# Patient Record
Sex: FEMALE | Race: WHITE | NOT HISPANIC OR LATINO | URBAN - METROPOLITAN AREA
[De-identification: names, ages, dates, MRNs, and addresses within clinical notes are randomized per-mention and may not be internally consistent; named-entity substitution may affect disease eponyms.]

---

## 2023-02-25 LAB
CHLAMYDIA TRACH., AMPLIFIED: NEGATIVE
N. GONORRHEA, AMPLIFIED: NEGATIVE
TRICHOMONAS VAGINALIS: NEGATIVE

## 2023-02-26 LAB — URINE CULTURE: NORMAL

## 2023-05-03 LAB
ALANINE AMINOTRANSFERASE (SGPT) (U/L) IN SER/PLAS: 12 U/L (ref 7–45)
ALBUMIN (G/DL) IN SER/PLAS: 4.4 G/DL (ref 3.4–5)
ALKALINE PHOSPHATASE (U/L) IN SER/PLAS: 49 U/L (ref 33–110)
ANION GAP IN SER/PLAS: 13 MMOL/L (ref 10–20)
ASPARTATE AMINOTRANSFERASE (SGOT) (U/L) IN SER/PLAS: 15 U/L (ref 9–39)
BASOPHILS (10*3/UL) IN BLOOD BY AUTOMATED COUNT: 0.05 X10E9/L (ref 0–0.1)
BASOPHILS/100 LEUKOCYTES IN BLOOD BY AUTOMATED COUNT: 0.5 % (ref 0–2)
BILIRUBIN TOTAL (MG/DL) IN SER/PLAS: 0.4 MG/DL (ref 0–1.2)
CALCIUM (MG/DL) IN SER/PLAS: 9.3 MG/DL (ref 8.6–10.3)
CARBON DIOXIDE, TOTAL (MMOL/L) IN SER/PLAS: 24 MMOL/L (ref 21–32)
CHLORIDE (MMOL/L) IN SER/PLAS: 105 MMOL/L (ref 98–107)
COBALAMIN (VITAMIN B12) (PG/ML) IN SER/PLAS: 213 PG/ML (ref 211–911)
CREATININE (MG/DL) IN SER/PLAS: 0.68 MG/DL (ref 0.5–1.05)
EOSINOPHILS (10*3/UL) IN BLOOD BY AUTOMATED COUNT: 0.18 X10E9/L (ref 0–0.7)
EOSINOPHILS/100 LEUKOCYTES IN BLOOD BY AUTOMATED COUNT: 1.9 % (ref 0–6)
ERYTHROCYTE DISTRIBUTION WIDTH (RATIO) BY AUTOMATED COUNT: 12.4 % (ref 11.5–14.5)
ERYTHROCYTE MEAN CORPUSCULAR HEMOGLOBIN CONCENTRATION (G/DL) BY AUTOMATED: 32.9 G/DL (ref 32–36)
ERYTHROCYTE MEAN CORPUSCULAR VOLUME (FL) BY AUTOMATED COUNT: 94 FL (ref 80–100)
ERYTHROCYTES (10*6/UL) IN BLOOD BY AUTOMATED COUNT: 4.4 X10E12/L (ref 4–5.2)
FERRITIN (UG/LL) IN SER/PLAS: 37 UG/L (ref 8–150)
GFR FEMALE: >90 ML/MIN/1.73M2
GLUCOSE (MG/DL) IN SER/PLAS: 78 MG/DL (ref 74–99)
HEMATOCRIT (%) IN BLOOD BY AUTOMATED COUNT: 41.3 % (ref 36–46)
HEMOGLOBIN (G/DL) IN BLOOD: 13.6 G/DL (ref 12–16)
IGA (MG/DL) IN SER/PLAS: 132 MG/DL (ref 70–400)
IMMATURE GRANULOCYTES/100 LEUKOCYTES IN BLOOD BY AUTOMATED COUNT: 0.3 % (ref 0–0.9)
IRON (UG/DL) IN SER/PLAS: 101 UG/DL (ref 35–150)
IRON BINDING CAPACITY (UG/DL) IN SER/PLAS: 509 UG/DL (ref 240–445)
IRON SATURATION (%) IN SER/PLAS: 20 % (ref 25–45)
LEUKOCYTES (10*3/UL) IN BLOOD BY AUTOMATED COUNT: 9.4 X10E9/L (ref 4.4–11.3)
LYMPHOCYTES (10*3/UL) IN BLOOD BY AUTOMATED COUNT: 2.23 X10E9/L (ref 1.2–4.8)
LYMPHOCYTES/100 LEUKOCYTES IN BLOOD BY AUTOMATED COUNT: 23.8 % (ref 13–44)
MONOCYTES (10*3/UL) IN BLOOD BY AUTOMATED COUNT: 0.63 X10E9/L (ref 0.1–1)
MONOCYTES/100 LEUKOCYTES IN BLOOD BY AUTOMATED COUNT: 6.7 % (ref 2–10)
NEUTROPHILS (10*3/UL) IN BLOOD BY AUTOMATED COUNT: 6.25 X10E9/L (ref 1.2–7.7)
NEUTROPHILS/100 LEUKOCYTES IN BLOOD BY AUTOMATED COUNT: 66.8 % (ref 40–80)
PLATELETS (10*3/UL) IN BLOOD AUTOMATED COUNT: 392 X10E9/L (ref 150–450)
POTASSIUM (MMOL/L) IN SER/PLAS: 4.1 MMOL/L (ref 3.5–5.3)
PROTEIN TOTAL: 7.5 G/DL (ref 6.4–8.2)
SODIUM (MMOL/L) IN SER/PLAS: 138 MMOL/L (ref 136–145)
THYROTROPIN (MIU/L) IN SER/PLAS BY DETECTION LIMIT <= 0.05 MIU/L: 0.77 MIU/L (ref 0.44–3.98)
THYROXINE (T4) FREE (NG/DL) IN SER/PLAS: 0.99 NG/DL (ref 0.61–1.12)
TISSUE TRANSGLUTAMINASE IGG: <1 U/ML (ref 0–14)
UREA NITROGEN (MG/DL) IN SER/PLAS: 6 MG/DL (ref 6–23)

## 2023-05-06 LAB — VITAMIN D 1,25-DIHYDROXY: 75.4 PG/ML (ref 19.9–79.3)

## 2023-05-08 ENCOUNTER — APPOINTMENT (OUTPATIENT)
Dept: PRIMARY CARE | Facility: CLINIC | Age: 23
End: 2023-05-08
Payer: OTHER GOVERNMENT

## 2023-05-18 ENCOUNTER — APPOINTMENT (OUTPATIENT)
Dept: PRIMARY CARE | Facility: CLINIC | Age: 23
End: 2023-05-18
Payer: OTHER GOVERNMENT

## 2023-07-12 ENCOUNTER — APPOINTMENT (OUTPATIENT)
Dept: PRIMARY CARE | Facility: CLINIC | Age: 23
End: 2023-07-12
Payer: OTHER GOVERNMENT

## 2023-10-03 ENCOUNTER — OFFICE VISIT (OUTPATIENT)
Dept: URGENT CARE | Facility: CLINIC | Age: 23
End: 2023-10-03
Payer: OTHER GOVERNMENT

## 2023-10-03 VITALS
OXYGEN SATURATION: 98 % | BODY MASS INDEX: 22.66 KG/M2 | RESPIRATION RATE: 14 BRPM | DIASTOLIC BLOOD PRESSURE: 87 MMHG | WEIGHT: 120 LBS | TEMPERATURE: 97.7 F | HEART RATE: 106 BPM | SYSTOLIC BLOOD PRESSURE: 125 MMHG | HEIGHT: 61 IN

## 2023-10-03 DIAGNOSIS — R39.15 URINARY URGENCY: ICD-10-CM

## 2023-10-03 DIAGNOSIS — N39.0 ACUTE UTI: Primary | ICD-10-CM

## 2023-10-03 LAB
POC APPEARANCE, URINE: ABNORMAL
POC BILIRUBIN, URINE: NEGATIVE
POC BLOOD, URINE: ABNORMAL
POC COLOR, URINE: YELLOW
POC GLUCOSE, URINE: NEGATIVE MG/DL
POC KETONES, URINE: NEGATIVE MG/DL
POC LEUKOCYTES, URINE: ABNORMAL
POC NITRITE,URINE: POSITIVE
POC PH, URINE: 6 PH
POC PROTEIN, URINE: ABNORMAL MG/DL
POC SPECIFIC GRAVITY, URINE: 1.02
POC UROBILINOGEN, URINE: 1 EU/DL
PREGNANCY TEST URINE, POC: NEGATIVE

## 2023-10-03 PROCEDURE — 81002 URINALYSIS NONAUTO W/O SCOPE: CPT | Performed by: PHYSICIAN ASSISTANT

## 2023-10-03 PROCEDURE — 99213 OFFICE O/P EST LOW 20 MIN: CPT | Performed by: PHYSICIAN ASSISTANT

## 2023-10-03 PROCEDURE — 81025 URINE PREGNANCY TEST: CPT | Performed by: PHYSICIAN ASSISTANT

## 2023-10-03 RX ORDER — CEPHALEXIN 500 MG/1
500 CAPSULE ORAL 4 TIMES DAILY
Qty: 28 CAPSULE | Refills: 0 | Status: SHIPPED | OUTPATIENT
Start: 2023-10-03 | End: 2023-10-10

## 2023-10-03 RX ORDER — HYOSCYAMINE SULFATE 0.12 MG/1
1 TABLET SUBLINGUAL 3 TIMES DAILY PRN
COMMUNITY
Start: 2023-05-03

## 2023-10-03 RX ORDER — NORETHINDRONE ACETATE AND ETHINYL ESTRADIOL, AND FERROUS FUMARATE 1.5-30(21)
1 KIT ORAL DAILY
COMMUNITY

## 2023-10-03 RX ORDER — NAPROXEN 500 MG/1
1 TABLET ORAL
COMMUNITY
Start: 2020-07-06

## 2023-10-03 RX ORDER — ONDANSETRON 4 MG/1
4 TABLET, ORALLY DISINTEGRATING ORAL EVERY 8 HOURS PRN
COMMUNITY
Start: 2023-08-28

## 2023-10-03 RX ORDER — PANTOPRAZOLE SODIUM 40 MG/1
40 TABLET, DELAYED RELEASE ORAL DAILY
COMMUNITY
Start: 2023-01-18

## 2023-10-03 RX ORDER — PROMETHAZINE HYDROCHLORIDE 12.5 MG/1
1 TABLET ORAL EVERY 4 HOURS PRN
COMMUNITY
Start: 2023-05-03

## 2023-10-03 RX ORDER — SERTRALINE HYDROCHLORIDE 50 MG/1
50 TABLET, FILM COATED ORAL DAILY
COMMUNITY

## 2023-10-03 ASSESSMENT — ENCOUNTER SYMPTOMS: DYSURIA: 1

## 2023-10-03 ASSESSMENT — PAIN SCALES - GENERAL: PAINLEVEL: 6

## 2023-10-03 NOTE — PROGRESS NOTES
Subjective   Patient ID: Paz Stevens is a 22 y.o. female.    Dragon inop.  Patient complains of dysuria and urine cloudiness for one day.          The following portions of the chart were reviewed this encounter and updated as appropriate:         Review of Systems   Genitourinary:  Positive for dysuria and urgency.   All other systems reviewed and are negative.    Objective   Physical Exam  Constitutional:       Appearance: Normal appearance. She is normal weight.   HENT:      Head: Normocephalic.      Mouth/Throat:      Mouth: Mucous membranes are moist.      Pharynx: Oropharynx is clear.   Eyes:      Extraocular Movements: Extraocular movements intact.      Conjunctiva/sclera: Conjunctivae normal.      Pupils: Pupils are equal, round, and reactive to light.   Pulmonary:      Effort: Pulmonary effort is normal.      Breath sounds: Normal breath sounds.   Abdominal:      General: Abdomen is flat.      Palpations: Abdomen is soft.   Skin:     General: Skin is warm and dry.   Neurological:      General: No focal deficit present.      Mental Status: She is alert and oriented to person, place, and time.   Psychiatric:         Mood and Affect: Mood normal.         Behavior: Behavior normal.         Thought Content: Thought content normal.         Judgment: Judgment normal.       Procedures    Assessment/Plan     Jay Jay hernándezp.  Urine culture ordered.  Prescribed Keflex 500 mg.    CLINICAL IMPRESSION:  Acute UTI

## 2023-10-06 LAB — BACTERIA UR CULT: ABNORMAL

## 2024-07-02 ENCOUNTER — APPOINTMENT (OUTPATIENT)
Dept: OBSTETRICS AND GYNECOLOGY | Facility: CLINIC | Age: 24
End: 2024-07-02
Payer: COMMERCIAL

## 2024-07-19 ENCOUNTER — APPOINTMENT (OUTPATIENT)
Dept: PRIMARY CARE | Facility: CLINIC | Age: 24
End: 2024-07-19
Payer: COMMERCIAL

## 2024-07-29 PROBLEM — N30.00 ACUTE CYSTITIS WITHOUT HEMATURIA: Status: ACTIVE | Noted: 2024-07-29

## 2024-07-29 PROBLEM — E53.8 VITAMIN B12 DEFICIENCY: Status: ACTIVE | Noted: 2024-07-29

## 2024-07-29 PROBLEM — E61.1 IRON DEFICIENCY: Status: ACTIVE | Noted: 2024-07-29

## 2024-07-29 PROBLEM — N39.0 ACUTE UTI: Status: ACTIVE | Noted: 2024-07-29

## 2024-07-29 PROBLEM — R11.0 NAUSEA IN ADULT: Status: ACTIVE | Noted: 2024-07-29

## 2024-07-29 PROBLEM — J32.9 SINUSITIS: Status: ACTIVE | Noted: 2024-07-29

## 2024-07-29 PROBLEM — N92.6 MENSTRUAL PERIOD LATE: Status: ACTIVE | Noted: 2024-07-29

## 2024-07-29 PROBLEM — N94.6 DYSMENORRHEA: Status: ACTIVE | Noted: 2024-07-29

## 2024-07-29 PROBLEM — F90.0 ADHD, PREDOMINANTLY INATTENTIVE TYPE: Status: ACTIVE | Noted: 2024-07-29

## 2024-07-29 PROBLEM — R53.83 FATIGUE: Status: ACTIVE | Noted: 2024-07-29

## 2024-07-29 PROBLEM — F41.9 ANXIETY: Status: ACTIVE | Noted: 2024-07-29

## 2024-07-29 PROBLEM — E55.9 VITAMIN D DEFICIENCY: Status: ACTIVE | Noted: 2024-07-29

## 2024-07-29 PROBLEM — K21.9 GERD (GASTROESOPHAGEAL REFLUX DISEASE): Status: ACTIVE | Noted: 2024-07-29

## 2024-07-29 PROBLEM — R10.13 EPIGASTRIC PAIN: Status: ACTIVE | Noted: 2024-07-29

## 2024-07-29 PROBLEM — J02.9 SORE THROAT: Status: ACTIVE | Noted: 2024-07-29

## 2024-07-29 PROBLEM — N76.0 BACTERIAL VAGINITIS: Status: ACTIVE | Noted: 2024-07-29

## 2024-07-29 PROBLEM — N91.2 AMENORRHEA: Status: ACTIVE | Noted: 2024-07-29

## 2024-07-29 PROBLEM — B96.89 BACTERIAL VAGINITIS: Status: ACTIVE | Noted: 2024-07-29

## 2024-07-29 PROBLEM — R35.0 INCREASED FREQUENCY OF URINATION: Status: ACTIVE | Noted: 2024-07-29

## 2024-07-29 PROBLEM — R39.9 UTI SYMPTOMS: Status: ACTIVE | Noted: 2024-07-29

## 2024-07-29 RX ORDER — DROSPIRENONE AND ETHINYL ESTRADIOL 0.03MG-3MG
KIT ORAL
COMMUNITY
End: 2024-07-30 | Stop reason: WASHOUT

## 2024-07-30 ENCOUNTER — APPOINTMENT (OUTPATIENT)
Dept: PRIMARY CARE | Facility: CLINIC | Age: 24
End: 2024-07-30
Payer: COMMERCIAL

## 2024-07-30 VITALS
BODY MASS INDEX: 24.55 KG/M2 | HEIGHT: 61 IN | SYSTOLIC BLOOD PRESSURE: 104 MMHG | DIASTOLIC BLOOD PRESSURE: 70 MMHG | WEIGHT: 130 LBS

## 2024-07-30 DIAGNOSIS — K21.9 GASTROESOPHAGEAL REFLUX DISEASE WITHOUT ESOPHAGITIS: ICD-10-CM

## 2024-07-30 DIAGNOSIS — F41.9 ANXIETY: ICD-10-CM

## 2024-07-30 DIAGNOSIS — Z00.00 HEALTH CARE MAINTENANCE: ICD-10-CM

## 2024-07-30 DIAGNOSIS — Z11.3 SCREEN FOR STD (SEXUALLY TRANSMITTED DISEASE): ICD-10-CM

## 2024-07-30 DIAGNOSIS — E55.9 VITAMIN D DEFICIENCY: Primary | ICD-10-CM

## 2024-07-30 PROCEDURE — 1036F TOBACCO NON-USER: CPT | Performed by: STUDENT IN AN ORGANIZED HEALTH CARE EDUCATION/TRAINING PROGRAM

## 2024-07-30 PROCEDURE — 99204 OFFICE O/P NEW MOD 45 MIN: CPT | Performed by: STUDENT IN AN ORGANIZED HEALTH CARE EDUCATION/TRAINING PROGRAM

## 2024-07-30 PROCEDURE — 3008F BODY MASS INDEX DOCD: CPT | Performed by: STUDENT IN AN ORGANIZED HEALTH CARE EDUCATION/TRAINING PROGRAM

## 2024-07-30 RX ORDER — PAROXETINE 10 MG/1
10 TABLET, FILM COATED ORAL EVERY MORNING
Qty: 30 TABLET | Refills: 1 | Status: SHIPPED | OUTPATIENT
Start: 2024-07-30 | End: 2024-09-28

## 2024-07-30 RX ORDER — HYDROXYZINE HYDROCHLORIDE 25 MG/1
25 TABLET, FILM COATED ORAL 2 TIMES DAILY PRN
Qty: 60 TABLET | Refills: 0 | Status: SHIPPED | OUTPATIENT
Start: 2024-07-30 | End: 2024-08-29

## 2024-07-30 NOTE — PROGRESS NOTES
"Subjective   Patient ID: Paz Stevens is a 23 y.o. female who presents for [facility] Care (anxiety).  Paz Stevens is a 24 yo woman with past medical history depression/anxiety, GERD who is presenting to established and for antidepressant medication recommendations. Patient mentions that she has been taking Sertraline for depression and anxiety, but recently stopped taking it in April 2024. She mentions that she felt like it wasn't working and was having side-effects - nausea, \"brain zaps\", insomnia. She had recently increased dose to 50mg from 25mg in December 2023. She also endorses that she has been having panic attacks that started this week. She endorses 1-2x a day for this week with 3x on Friday. During these attacks, she endorses dyspnea, crying, chest pain, and shaking. Patient mentions that she only takes her birth control and the sertraline and PRN zolfran and phenergan.     PMH: As stated above  PSH: No surgeries  Social: Lives with father, step-mom, and step-brother. 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 0-1 drinks/week, no tobacco use, no drugs.  Family: No family of cancer or heart disease.  Medications/Allergies: Anaphylaxis with macrobid           Review of Systems  ROS: 12 systems reviewed and negative except otherwise noted in HPI above.    Objective   Physical Exam  .GEN: conversant, NAD  HEENT: PERRL, EOMI, MMM OP clear, TMs clear bilaterally  NECK: supple, no cervical LAD, no carotid bruits  CV: S1, S2, regular, no murmur  PULM: CTAB  ABD: soft, NT, ND, BS+  EXT: no LE edema  NEURO: no gross focal deficits  PSYCH: appropriate affect       Assessment/Plan   Problem List Items Addressed This Visit             ICD-10-CM    Anxiety F41.9    Relevant Medications    hydrOXYzine HCL (Atarax) 25 mg tablet    PARoxetine (Paxil) 10 mg tablet    Other Relevant Orders    Referral to Psychology    Referral to [facility]    GERD (gastroesophageal " reflux disease) K21.9    Relevant Orders    US right upper quadrant    Vitamin D deficiency - Primary E55.9    Relevant Orders    Vitamin D 25-Hydroxy,Total (for eval of Vitamin D levels)     Other Visit Diagnoses         Codes    Screen for STD (sexually transmitted disease)     Z11.3    Relevant Orders    C. Trachomatis / N. Gonorrhoeae, Amplified Detection    HIV 1/2 Antigen/Antibody Screen with Reflex to Confirmation    Syphilis Screen with Reflex    Trichomonas vaginalis, Nucleic Acid Detection    Health care maintenance     Z00.00    Relevant Orders    CBC    Comprehensive Metabolic Panel    Lipid panel    TSH with reflex to Free T4 if abnormal          #Anxiety/Depression  #Panic Disorder  - Will switch to Paroxetine from Sertraline and add PRN atarax for panic episodes  - Will give referral to Psychologist for possible CBT    #Nausea  #Epigastric Pain  Patient has had endoscopy with no significant pathology  - Will check RUQ U/S    #Health Maintenance  - Patient follows with OBGYN  - Will get labs: CBC, CMP, Lipid Panel, Vitamin D  - Will check STI panel    Return to care in 6 to 12-month     Yonatan Romero MD 07/30/24 10:26 AM     Patient seen and examined for care, continues issues with anxiety, recommend alternative SSRI discussed side effect profile psych and [facility] to referral.  Routine lab work, discussed care plan, she is agreeable, she will call with worsening symptoms or concerns.  Return to care in 6 to 12 months or earlier if symptoms not improved

## 2024-08-06 ENCOUNTER — OFFICE VISIT (OUTPATIENT)
Dept: BEHAVIORAL HEALTH | Facility: CLINIC | Age: 24
End: 2024-08-06
Payer: COMMERCIAL

## 2024-08-06 DIAGNOSIS — F41.9 ANXIETY: ICD-10-CM

## 2024-08-13 ENCOUNTER — PATIENT MESSAGE (OUTPATIENT)
Dept: PRIMARY CARE | Facility: CLINIC | Age: 24
End: 2024-08-13
Payer: COMMERCIAL

## 2024-08-13 DIAGNOSIS — F41.9 ANXIETY: ICD-10-CM

## 2024-08-15 ENCOUNTER — OFFICE VISIT (OUTPATIENT)
Dept: BEHAVIORAL HEALTH | Facility: CLINIC | Age: 24
End: 2024-08-15
Payer: COMMERCIAL

## 2024-08-15 RX ORDER — PAROXETINE HYDROCHLORIDE 20 MG/1
20 TABLET, FILM COATED ORAL EVERY MORNING
Qty: 90 TABLET | Refills: 1 | Status: SHIPPED | OUTPATIENT
Start: 2024-08-15 | End: 2025-02-11

## 2024-08-21 DIAGNOSIS — Z30.41 ENCOUNTER FOR SURVEILLANCE OF CONTRACEPTIVE PILLS: Primary | ICD-10-CM

## 2024-08-21 RX ORDER — NORETHINDRONE ACETATE AND ETHINYL ESTRADIOL, AND FERROUS FUMARATE 1.5-30(21)
1 KIT ORAL DAILY
Qty: 28 TABLET | Refills: 0 | Status: SHIPPED | OUTPATIENT
Start: 2024-08-21 | End: 2024-08-23 | Stop reason: SDUPTHER

## 2024-08-23 DIAGNOSIS — Z30.41 ENCOUNTER FOR SURVEILLANCE OF CONTRACEPTIVE PILLS: ICD-10-CM

## 2024-08-23 RX ORDER — NORETHINDRONE ACETATE AND ETHINYL ESTRADIOL, AND FERROUS FUMARATE 1.5-30(21)
1 KIT ORAL DAILY
Qty: 28 TABLET | Refills: 3 | Status: SHIPPED | OUTPATIENT
Start: 2024-08-23

## 2024-08-27 ENCOUNTER — APPOINTMENT (OUTPATIENT)
Dept: BEHAVIORAL HEALTH | Facility: CLINIC | Age: 24
End: 2024-08-27
Payer: COMMERCIAL

## 2024-09-17 ENCOUNTER — APPOINTMENT (OUTPATIENT)
Dept: BEHAVIORAL HEALTH | Facility: CLINIC | Age: 24
End: 2024-09-17
Payer: COMMERCIAL

## 2024-09-17 DIAGNOSIS — F41.9 ANXIETY: ICD-10-CM

## 2024-09-18 RX ORDER — PAROXETINE HYDROCHLORIDE 20 MG/1
20 TABLET, FILM COATED ORAL EVERY MORNING
Qty: 90 TABLET | Refills: 1 | Status: SHIPPED | OUTPATIENT
Start: 2024-09-18 | End: 2025-03-17

## 2024-09-23 DIAGNOSIS — F41.9 ANXIETY: ICD-10-CM

## 2024-09-23 DIAGNOSIS — Z30.41 ENCOUNTER FOR SURVEILLANCE OF CONTRACEPTIVE PILLS: ICD-10-CM

## 2024-09-23 RX ORDER — NORETHINDRONE ACETATE AND ETHINYL ESTRADIOL, AND FERROUS FUMARATE 1.5-30(21)
1 KIT ORAL DAILY
Qty: 84 TABLET | Refills: 3 | Status: SHIPPED | OUTPATIENT
Start: 2024-09-23

## 2024-09-23 RX ORDER — HYDROXYZINE HYDROCHLORIDE 25 MG/1
25 TABLET, FILM COATED ORAL 2 TIMES DAILY PRN
Qty: 180 TABLET | Refills: 1 | Status: SHIPPED | OUTPATIENT
Start: 2024-09-23 | End: 2025-03-22

## 2025-02-04 ENCOUNTER — APPOINTMENT (OUTPATIENT)
Dept: PRIMARY CARE | Facility: CLINIC | Age: 25
End: 2025-02-04
Payer: COMMERCIAL

## 2025-07-22 DIAGNOSIS — Z30.41 ENCOUNTER FOR SURVEILLANCE OF CONTRACEPTIVE PILLS: ICD-10-CM

## 2025-07-22 RX ORDER — NORETHINDRONE ACETATE AND ETHINYL ESTRADIOL AND FERROUS FUMARATE 1.5-30(21)
1 KIT ORAL DAILY
Qty: 28 TABLET | Refills: 5 | Status: SHIPPED | OUTPATIENT
Start: 2025-07-22